# Patient Record
Sex: FEMALE | Race: WHITE | NOT HISPANIC OR LATINO | Employment: FULL TIME | URBAN - METROPOLITAN AREA
[De-identification: names, ages, dates, MRNs, and addresses within clinical notes are randomized per-mention and may not be internally consistent; named-entity substitution may affect disease eponyms.]

---

## 2022-09-28 NOTE — PATIENT INSTRUCTIONS
Pharyngitis   AMBULATORY CARE:   Pharyngitis , or sore throat, is inflammation of the tissues and structures in your pharynx (throat)  Pharyngitis is most often caused by bacteria  It may also be caused by a cold or flu virus  Other causes include smoking, allergies, or acid reflux  Signs and symptoms that may occur with pharyngitis:   Sore throat or pain when you swallow    Fever, chills, and body aches    Hoarse or raspy voice    Cough, runny or stuffy nose, itchy or watery eyes    Headache    Upset stomach and loss of appetite    Mild neck stiffness    Swollen glands that feel like hard lumps when you touch your neck    White and yellow pus-filled blisters in the back of your throat    Call 911 for any of the following: You have trouble breathing or swallowing because your throat is swollen or sore  Seek care immediately if:   You are drooling because it hurts too much to swallow  Your fever is higher than 102? F (39?C) or lasts longer than 3 days  You are confused  You taste blood in your throat  Contact your healthcare provider if:   Your throat pain gets worse  You have a painful lump in your throat that does not go away after 5 days  Your symptoms do not improve after 5 days  You have questions or concerns about your condition or care  Treatment for pharyngitis:  Viral pharyngitis will go away on its own without treatment  Your sore throat should start to feel better in 3 to 5 days for both viral and bacterial infections  You may need any of the following:  Antibiotics  treat a bacterial infection  NSAIDs , such as ibuprofen, help decrease swelling, pain, and fever  NSAIDs can cause stomach bleeding or kidney problems in certain people  If you take blood thinner medicine, always ask your healthcare provider if NSAIDs are safe for you  Always read the medicine label and follow directions  Acetaminophen  decreases pain and fever  It is available without a doctor's order  Ask how much to take and how often to take it  Follow directions  Acetaminophen can cause liver damage if not taken correctly  Manage your symptoms:   Gargle salt water  Mix ¼ teaspoon salt in an 8 ounce glass of warm water and gargle  This may help decrease swelling in your throat  Drink liquids as directed  You may need to drink more liquids than usual  Liquids may help soothe your throat and prevent dehydration  Ask how much liquid to drink each day and which liquids are best for you  Use a cool-steam humidifier  to help moisten the air in your room and calm your cough  Soothe your throat  with cough drops, ice, soft foods, or popsicles  Prevent the spread of pharyngitis:  Cover your mouth and nose when you cough or sneeze  Do not share food or drinks  Wash your hands often  Use soap and water  If soap and water are unavailable, use an alcohol based hand   Follow up with your doctor as directed:  Write down your questions so you remember to ask them during your visits  © Copyright [employer] 2022 Information is for End User's use only and may not be sold, redistributed or otherwise used for commercial purposes  All illustrations and images included in CareNotes® are the copyrighted property of A D A M [employer] Inc  or [facility] Rolando Pink   The above information is an  only  It is not intended as medical advice for individual conditions or treatments  Talk to your doctor, nurse or pharmacist before following any medical regimen to see if it is safe and effective for you

## 2022-09-28 NOTE — PROGRESS NOTES
Name: Maciej Payment      : 1965      MRN: [id number]  Encounter Provider: JEANINE Mullen  Encounter Date: 2022   Encounter department: [facility] [address] [place] [address]    Assessment & Plan     Patient presentation suggestive of upper respiratory infection vs bacterial sinusitis  Due to patient's history of COPD and double sickening presentation, will treat more aggressively  Doxycycline 100 mg b i d  X 10 days ordered for patient  Antibiotic was selected as it will cover for both Lyme disease and bacterial sinusitis  Rapid COVID test was negative in the office today  Educated patient that this antibiotic will cause photosensitivity and encouraged her to wear sunscreen if outdoors  Discussed what signs and symptoms warrant emergent medical care  Patient verbalized understanding  1  Acute bacterial rhinosinusitis  -     doxycycline hyclate (VIBRAMYCIN) 100 mg capsule; Take 1 capsule (100 mg total) by mouth every 12 (twelve) hours for 10 days    2  Sore throat  -     POCT Rapid Covid Ag    3  Insect bite of lower back, initial encounter         Subjective      Sore Throat  Patient complains of sore throat  Associated symptoms include headache, hoarseness, productive cough and sore throat  Onset of symptoms was 1 week ago, and have been gradually worsening since that time (double sickening effect)  She is drinking plenty of fluids  She has not had recent close exposure to someone with proven streptococcal pharyngitis  Boyfriend has bronchitis  Tested for COVID before sore throat, result was negative  Had a bug bite on left flank about 4 weeks ago and had a headache, denies that it was a tick bite  "That headache is done but I do have one today"  Treating currently with hot tea and OTC analgesics, little improvement  Hx of COPD    Review of Systems   Constitutional: Negative for fatigue and fever     HENT: Positive for rhinorrhea and sore throat  Negative for sinus pressure and sinus pain  B/l ear fullness   Eyes: Negative  Respiratory: Positive for cough  Gastrointestinal: Positive for nausea  Endocrine: Negative  Genitourinary: Negative  Musculoskeletal: Negative  Skin: Negative for rash  Allergic/Immunologic: Negative  Neurological: Positive for headaches  Hematological: Negative  Psychiatric/Behavioral: Negative  Current Outpatient Medications on File Prior to Visit   Medication Sig    albuterol (PROVENTIL HFA,VENTOLIN HFA) 90 mcg/act inhaler as needed    atenolol (TENORMIN) 25 mg tablet Take 25 mg by mouth daily    Calcium Carb-Cholecalciferol 500-400 MG-UNIT CHEW     sertraline (ZOLOFT) 25 mg tablet Take 25 mg by mouth daily    temazepam (RESTORIL) 15 mg capsule TAKE 1 CAPSULE BY MOUTH NIGHTLY AS NEEDED FOR INSOMNIA    Trelegy Ellipta 100-62 5-25 MCG/INH inhaler Inhale 1 puff daily       Objective     /60 (BP Location: Left arm, Patient Position: Sitting, Cuff Size: Large)   Pulse 85   Temp 98 2 °F (36 8 °C)   Ht 5' 4" (1 626 m)   Wt 63 5 kg (140 lb)   SpO2 98%   BMI 24 03 kg/m²     Physical Exam  Constitutional:       General: She is not in acute distress  Appearance: She is well-developed  HENT:      Head: Normocephalic  Right Ear: Tympanic membrane and ear canal normal       Left Ear: Tympanic membrane and ear canal normal       Nose: Rhinorrhea present  No congestion  Right Sinus: No maxillary sinus tenderness or frontal sinus tenderness  Left Sinus: No maxillary sinus tenderness or frontal sinus tenderness  Mouth/Throat:      Mouth: Mucous membranes are moist       Pharynx: Oropharynx is clear  Posterior oropharyngeal erythema present  No oropharyngeal exudate  Tonsils: No tonsillar exudate or tonsillar abscesses  1+ on the right  1+ on the left  Cardiovascular:      Rate and Rhythm: Normal rate and regular rhythm        Heart sounds: Normal heart sounds  Pulmonary:      Effort: Pulmonary effort is normal       Breath sounds: Wheezing present  Lymphadenopathy:      Cervical: No cervical adenopathy  Skin:     Findings: No rash  Neurological:      General: No focal deficit present  Mental Status: She is alert and oriented to person, place, and time     Psychiatric:         Mood and Affect: Mood normal          Behavior: Behavior normal        JEANINE Muhammad

## 2022-09-29 ENCOUNTER — OFFICE VISIT (OUTPATIENT)
Dept: FAMILY MEDICINE CLINIC | Facility: CLINIC | Age: 57
End: 2022-09-29

## 2022-09-29 VITALS
WEIGHT: 140 LBS | HEART RATE: 85 BPM | HEIGHT: 64 IN | SYSTOLIC BLOOD PRESSURE: 122 MMHG | DIASTOLIC BLOOD PRESSURE: 60 MMHG | OXYGEN SATURATION: 98 % | TEMPERATURE: 98.2 F | BODY MASS INDEX: 23.9 KG/M2
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
## 2022-09-29 DIAGNOSIS — B96.89 ACUTE BACTERIAL RHINOSINUSITIS: Primary | ICD-10-CM

## 2022-09-29 DIAGNOSIS — S30.860A INSECT BITE OF LOWER BACK, INITIAL ENCOUNTER: ICD-10-CM

## 2022-09-29 DIAGNOSIS — J02.9 SORE THROAT: ICD-10-CM

## 2022-09-29 DIAGNOSIS — J01.90 ACUTE BACTERIAL RHINOSINUSITIS: Primary | ICD-10-CM

## 2022-09-29 PROBLEM — M54.50 LOW BACK PAIN: Status: ACTIVE | Noted: 2022-09-29

## 2022-09-29 PROBLEM — G47.00 INSOMNIA: Status: ACTIVE | Noted: 2022-09-29

## 2022-09-29 PROBLEM — B18.2 CARRIER OF VIRAL HEPATITIS C (HCC): Status: ACTIVE | Noted: 2021-12-20

## 2022-09-29 PROBLEM — R51.9 DAILY HEADACHE: Status: ACTIVE | Noted: 2021-11-16

## 2022-09-29 PROBLEM — R41.3 POOR SHORT TERM MEMORY: Status: ACTIVE | Noted: 2021-11-16

## 2022-09-29 PROBLEM — J45.909 REACTIVE AIRWAY DISEASE: Status: ACTIVE | Noted: 2022-09-29

## 2022-09-29 PROBLEM — M51.37 DEGENERATION OF LUMBOSACRAL INTERVERTEBRAL DISC: Status: ACTIVE | Noted: 2022-09-29

## 2022-09-29 PROBLEM — F41.8 MIXED ANXIETY DEPRESSIVE DISORDER: Status: ACTIVE | Noted: 2022-09-29

## 2022-09-29 PROBLEM — M51.379 DEGENERATION OF LUMBOSACRAL INTERVERTEBRAL DISC: Status: ACTIVE | Noted: 2022-09-29

## 2022-09-29 PROBLEM — J44.9 CHRONIC OBSTRUCTIVE PULMONARY DISEASE (HCC): Status: ACTIVE | Noted: 2018-12-07

## 2022-09-29 PROBLEM — Z72.0 TOBACCO USER: Status: ACTIVE | Noted: 2020-01-09

## 2022-09-29 LAB
SARS-COV-2 AG UPPER RESP QL IA: NEGATIVE
VALID CONTROL: NORMAL

## 2022-09-29 PROCEDURE — 87811 SARS-COV-2 COVID19 W/OPTIC: CPT | Performed by: NURSE PRACTITIONER

## 2022-09-29 PROCEDURE — 99203 OFFICE O/P NEW LOW 30 MIN: CPT | Performed by: NURSE PRACTITIONER

## 2022-09-29 RX ORDER — ALBUTEROL SULFATE 90 UG/1
AEROSOL, METERED RESPIRATORY (INHALATION) AS NEEDED
COMMUNITY

## 2022-09-29 RX ORDER — SERTRALINE HYDROCHLORIDE 25 MG/1
25 TABLET, FILM COATED ORAL DAILY
COMMUNITY
Start: 2022-08-12

## 2022-09-29 RX ORDER — DOXYCYCLINE HYCLATE 100 MG/1
100 CAPSULE ORAL EVERY 12 HOURS SCHEDULED
Qty: 20 CAPSULE | Refills: 0 | Status: SHIPPED | OUTPATIENT
Start: 2022-09-29 | End: 2022-10-09

## 2022-09-29 RX ORDER — TEMAZEPAM 15 MG/1
CAPSULE ORAL
COMMUNITY
Start: 2022-08-19

## 2022-09-29 RX ORDER — ATENOLOL 25 MG/1
25 TABLET ORAL DAILY
COMMUNITY
Start: 2022-09-27

## 2023-06-02 NOTE — PROGRESS NOTES
Assessment/Plan:    No problem-specific Assessment & Plan notes found for this encounter  Diagnoses and all orders for this visit:    Ruptured or perforated eardrum, left  -     ofloxacin (FLOXIN) 0 3 % otic solution; Administer 5 drops into the left ear daily    Left ear pain  -     ofloxacin (FLOXIN) 0 3 % otic solution; Administer 5 drops into the left ear daily    Other orders  -     Biotin 1 MG CAPS; Take by mouth  -     magnesium oxide (MAG-OX) 400 mg tablet; Take 400 mg by mouth daily  -     Rhofade 1 % CREA; Apply TO FACE EVERY MORNING  -     pravastatin (PRAVACHOL) 20 mg tablet; Take 20 mg by mouth every evening  -     sertraline (ZOLOFT) 100 mg tablet; Take 100 mg by mouth daily          Patient is a 47-year-old female presenting today for left sided ear pain  On examination patient had a left-sided perforated eardrum  I do not see any signs of infection however I will prescribe patient ofloxacin drops to have on hand in case her pain starts to worsen  I provided her with instructions today on how to care for ruptured eardrum at home she has a very small perforation at the lower aspect of the left eardrum  I discussed with her typical course can take several weeks to 1 to 2 months to heal   The pain should improve with time  She should try to wear earplugs and not get any fluid in her ears when showering  I did advise that if symptoms continue or get worse then she will need to see an ENT physician for an evaluation  Patient understands all of the above  20 Minutes spent on physical exam and plan of care  This note was dictated using software  Subjective:      Patient ID: Merle Costa is a 59 y o  female      HPI    The following portions of the patient's history were reviewed and updated as appropriate: allergies, current medications, past family history, past medical history, past social history, past surgical history and problem list     Patient complains of ear pain and possible ear infection  Symptoms include left ear pain  Onset of symptoms was 5 days ago, and have been worse since trying to clear out ear with squeeze bottle since that time  Associated symptoms include: none  Patient denies: congestion, sinus pressure, sneezing and sore throat  She is drinking plenty of fluids  Patient states that this started when she was doing a treatment at home to try and remove earwax  She describes it as a type of suction device and she began to have sudden sharp severe pain  The pain subsided however she has a feeling of air sensation in the ear and aching sensation  Review of Systems   HENT: Positive for ear pain  Negative for hearing loss and sinus pressure  Respiratory: Negative for shortness of breath  Cardiovascular: Negative for chest pain  Gastrointestinal: Negative for abdominal pain  Skin: Negative for rash  Objective:      /66 (BP Location: Right arm, Patient Position: Sitting, Cuff Size: Standard)   Pulse 62   Temp (!) 96 °F (35 6 °C)   Wt 64 4 kg (142 lb)   SpO2 97%   BMI 24 37 kg/m²          Physical Exam  Vitals and nursing note reviewed  Constitutional:       General: She is not in acute distress  Appearance: Normal appearance  HENT:      Head: Normocephalic and atraumatic  Right Ear: Tympanic membrane, ear canal and external ear normal  There is no impacted cerumen  Tympanic membrane is not perforated or erythematous  Left Ear: Ear canal and external ear normal  There is no impacted cerumen  Tympanic membrane is perforated  Tympanic membrane is not erythematous  Nose: Nose normal       Mouth/Throat:      Mouth: Mucous membranes are moist       Pharynx: Oropharynx is clear  Eyes:      Extraocular Movements: Extraocular movements intact  Conjunctiva/sclera: Conjunctivae normal       Pupils: Pupils are equal, round, and reactive to light  Cardiovascular:      Rate and Rhythm: Normal rate and regular rhythm  Heart sounds: Normal heart sounds  No murmur heard  No friction rub  No gallop  Pulmonary:      Effort: Pulmonary effort is normal  No respiratory distress  Breath sounds: Normal breath sounds  No wheezing  Musculoskeletal:         General: Normal range of motion  Cervical back: Normal range of motion  Skin:     General: Skin is warm and dry  Findings: No erythema or rash  Neurological:      General: No focal deficit present  Mental Status: She is alert and oriented to person, place, and time  Mental status is at baseline  Psychiatric:         Mood and Affect: Mood normal          Behavior: Behavior normal          Thought Content:  Thought content normal          Judgment: Judgment normal

## 2023-06-05 ENCOUNTER — OFFICE VISIT (OUTPATIENT)
Age: 58
End: 2023-06-05

## 2023-06-05 VITALS
BODY MASS INDEX: 24.37 KG/M2 | OXYGEN SATURATION: 97 % | HEART RATE: 62 BPM | WEIGHT: 142 LBS | SYSTOLIC BLOOD PRESSURE: 102 MMHG | DIASTOLIC BLOOD PRESSURE: 66 MMHG | TEMPERATURE: 96 F

## 2023-06-05 DIAGNOSIS — H92.02 LEFT EAR PAIN: ICD-10-CM

## 2023-06-05 DIAGNOSIS — H72.92 RUPTURED OR PERFORATED EARDRUM, LEFT: Primary | ICD-10-CM

## 2023-06-05 PROCEDURE — 99213 OFFICE O/P EST LOW 20 MIN: CPT | Performed by: STUDENT IN AN ORGANIZED HEALTH CARE EDUCATION/TRAINING PROGRAM

## 2023-06-05 RX ORDER — SERTRALINE HYDROCHLORIDE 100 MG/1
100 TABLET, FILM COATED ORAL DAILY
COMMUNITY
Start: 2023-05-31

## 2023-06-05 RX ORDER — MAGNESIUM OXIDE 400 MG/1
400 TABLET ORAL DAILY
COMMUNITY

## 2023-06-05 RX ORDER — OFLOXACIN 3 MG/ML
5 SOLUTION AURICULAR (OTIC) DAILY
Qty: 5 ML | Refills: 0 | Status: SHIPPED | OUTPATIENT
Start: 2023-06-05

## 2023-06-05 RX ORDER — PRAVASTATIN SODIUM 20 MG
20 TABLET ORAL EVERY EVENING
COMMUNITY
Start: 2023-05-22

## 2023-06-05 RX ORDER — OXYMETAZOLINE HYDROCHLORIDE 1 G/100G
CREAM TOPICAL
COMMUNITY
Start: 2023-02-28

## 2023-06-05 RX ORDER — NICOTINE POLACRILEX 2 MG
GUM BUCCAL
COMMUNITY

## 2025-04-16 ENCOUNTER — TELEPHONE (OUTPATIENT)
Age: 60
End: 2025-04-16

## 2025-04-16 NOTE — TELEPHONE ENCOUNTER
Pt stated that she is looking for an orthopedic provider. I read [facility]PA disclaimer pt stated that she had all those injections done already. She would like to be see by orthopedic provider for her medications. Transferred pt to ortho.

## 2025-04-28 ENCOUNTER — APPOINTMENT (OUTPATIENT)
Age: 60
End: 2025-04-28
Attending: STUDENT IN AN ORGANIZED HEALTH CARE EDUCATION/TRAINING PROGRAM
Payer: MEDICARE

## 2025-04-28 ENCOUNTER — OFFICE VISIT (OUTPATIENT)
Age: 60
End: 2025-04-28
Payer: MEDICARE

## 2025-04-28 VITALS — HEIGHT: 64 IN | BODY MASS INDEX: 23.9 KG/M2 | WEIGHT: 140 LBS

## 2025-04-28 DIAGNOSIS — G89.29 CHRONIC BILATERAL LOW BACK PAIN WITHOUT SCIATICA: Primary | ICD-10-CM

## 2025-04-28 DIAGNOSIS — M47.816 LUMBAR SPONDYLOSIS: ICD-10-CM

## 2025-04-28 DIAGNOSIS — M48.062 SPINAL STENOSIS OF LUMBAR REGION WITH NEUROGENIC CLAUDICATION: ICD-10-CM

## 2025-04-28 DIAGNOSIS — M54.50 CHRONIC BILATERAL LOW BACK PAIN WITHOUT SCIATICA: Primary | ICD-10-CM

## 2025-04-28 DIAGNOSIS — M54.50 CHRONIC BILATERAL LOW BACK PAIN WITHOUT SCIATICA: ICD-10-CM

## 2025-04-28 DIAGNOSIS — G89.29 CHRONIC BILATERAL LOW BACK PAIN WITHOUT SCIATICA: ICD-10-CM

## 2025-04-28 PROCEDURE — 72100 X-RAY EXAM L-S SPINE 2/3 VWS: CPT

## 2025-04-28 PROCEDURE — 99204 OFFICE O/P NEW MOD 45 MIN: CPT | Performed by: STUDENT IN AN ORGANIZED HEALTH CARE EDUCATION/TRAINING PROGRAM

## 2025-04-28 RX ORDER — GABAPENTIN 100 MG/1
100 CAPSULE ORAL 3 TIMES DAILY
Qty: 90 CAPSULE | Refills: 0 | Status: SHIPPED | OUTPATIENT
Start: 2025-04-28

## 2025-04-28 NOTE — PROGRESS NOTES
Name: Aroldo Leyva      : 1965      MRN: [id number]  Encounter Provider: Nikolay De Jesus MD  Encounter Date: 2025   Encounter department: [facility]      Assessment & Plan  Chronic bilateral low back pain without sciatica    Clinical exam and radiographic imaging reviewed with patient/parent today, with impression as per above. I have discussed with the patient the pathophysiology of this diagnosis and reviewed how the examination correlates with this diagnosis.    Imaging obtained/reviewed as per below. I will follow up official radiology interpretation.  Given patient's reported chronicity of this issue ongoing for years and lack of relief from conservative treatment such as formal PT, lumbar injections with pain management (reportedly went to [facility]), acetaminophen, NSAIDs, muscle relaxers, heat/ice therapy I counseled she could consider discussing with orthopedic spine surgery in regards to potential surgical interventions.  Patient prefers to avoid surgical interventions if possible but is open to discussing further if it is absolutely needed as she feels this pain has been interfering with her activities of daily living.  In the interim, I did offer returning back to formal physical therapy to help facilitate improving her lumbar range of motion as she does demonstrate some stiffness on exam.  She was agreeable to this and referral was placed.  In regards to pain control counseled as needed use of acetaminophen, NSAIDs, Robaxin.  She also was prescribed tramadol for severe pain.  I did prescribe her gabapentin 100 mg p.o. 3 times daily and counseled her for taper up from nightly to 3 times daily as tolerated and discussed potential side effects of gabapentin.    Orders:    XR spine lumbar 2 or 3 views injury; Future    MRI lumbar spine wo contrast; Future    Ambulatory Referral to Orthopedic Surgery; Future    Ambulatory  Referral to Physical Therapy; Future    gabapentin (Neurontin) 100 mg capsule; Take 1 capsule (100 mg total) by mouth 3 (three) times a day    Spinal stenosis of lumbar region with neurogenic claudication    Orders:    XR spine lumbar 2 or 3 views injury; Future    MRI lumbar spine wo contrast; Future    Ambulatory Referral to Orthopedic Surgery; Future    Ambulatory Referral to Physical Therapy; Future    gabapentin (Neurontin) 100 mg capsule; Take 1 capsule (100 mg total) by mouth 3 (three) times a day    Lumbar spondylosis    Orders:    XR spine lumbar 2 or 3 views injury; Future    MRI lumbar spine wo contrast; Future    Ambulatory Referral to Orthopedic Surgery; Future    Ambulatory Referral to Physical Therapy; Future    gabapentin (Neurontin) 100 mg capsule; Take 1 capsule (100 mg total) by mouth 3 (three) times a day         Return for Follow up with Dr. Hathaway from spine surgery after MRI. For second opinion/discussion for possible surgery    History of Present Illness       Chief Complaint   Patient presents with    Spine - Pain       HPI:  Aroldo Leyva is a 59 y.o. female  who presents her with her mother for     Visit 4/28/2025:  Initial evaluation of low back pain:  Of note, patient states she had been managed through [facility] already and is here today for orthopedic evaluation.  I am unable to review the notes from [facility].  Patient discusses undergoing lumbar injections as well as an evaluation for a spinal stimulator but did not feel there was any relief from these interventions.  She states she was told in the past that she cannot undergo surgical intervention for her spine but is unsure the details of why she was told this.  Previously treated for a comp injury after falling down steps on 11/29/2023 in regards to her low back and left shin pain.    Treated conservatively with formal physical therapy, NSAIDs, muscle relaxers.  She also was  prescribed tramadol for severe pain.  She attended physical therapy as well.  She states from these interventions there is very limited relief in regards to the intensity and frequency of pain.  She states the pain currently is a aching sensation along the midline and paralumbar aspect of her back.  Denies pain radiates down her legs.  Reports intermittent tingling sensations of her left leg in nonspecific distribution.  Patient unsure what aggravates the numbness of her left lower extremity.  She states the pain is constant but also aggravated with prolonged sitting, prolonged walking.  She does endorse a sensation of lower extremity fatigue while weightbearing and feels she has to frequently rest after about 3 blocks of walking before she can regain the sensation of being able to walk again.    Denies bowel/bladder incontinence    Concurrently has also been seeing orthopedics for her left knee and undergoing cortisone injections of her left knee.      History reviewed. No pertinent past medical history.    History reviewed. No pertinent surgical history.    Current Outpatient Medications on File Prior to Visit   Medication Sig Dispense Refill    albuterol (PROVENTIL HFA,VENTOLIN HFA) 90 mcg/act inhaler as needed      atenolol (TENORMIN) 25 mg tablet Take 25 mg by mouth daily      Biotin 1 MG CAPS Take by mouth      Calcium Carb-Cholecalciferol 500-400 MG-UNIT CHEW       magnesium oxide (MAG-OX) 400 mg tablet Take 400 mg by mouth daily      pravastatin (PRAVACHOL) 20 mg tablet Take 20 mg by mouth every evening      sertraline (ZOLOFT) 25 mg tablet Take 25 mg by mouth daily      Trelegy Ellipta 100-62.5-25 MCG/INH inhaler Inhale 1 puff daily      ofloxacin (FLOXIN) 0.3 % otic solution Administer 5 drops into the left ear daily 5 mL 0    Rhofade 1 % CREA Apply TO FACE EVERY MORNING      sertraline (ZOLOFT) 100 mg tablet Take 100 mg by mouth daily (Patient not taking: Reported on 4/28/2025)      temazepam (RESTORIL)  "15 mg capsule TAKE 1 CAPSULE BY MOUTH NIGHTLY AS NEEDED FOR INSOMNIA       No current facility-administered medications on file prior to visit.        Social History     Objective     Ht 5' 4\" (1.626 m)   Wt 63.5 kg (140 lb)   BMI 24.03 kg/m²     Constitutional:  see vital signs  Gen: well-developed, normocephalic/atraumatic, well-groomed  SKIN: no visible rashes or skin lesions  Pulmonary/Chest: Effort normal. No respiratory distress.      Nikolay De Jesus MD     Ortho Exam  BACK EXAM:  Posture: Normal. No kyphosis, lordosis, scoliosis  Gait: normal, no trendelenberg gait, no antalgic gait, no shuffling gait    BACK TENDERNESS:  Spinous Processes: no  Paraspinal Muscles: + Mildly paralumbar bilaterally  SI Joint: no  Sacrum/Coccyx: no    ROM:  Flexion: 80  Extension: 30  Lateral flexion: 20 b/l, aggravates bilateral paralumbar tightness/aching per patient  Rotation: intact: 20 b/l, aggravates bilateral paralumbar tightness/aching per patient    DERMATOMAL SENSATION:  L1: normal   L2: normal   L3: normal   L4: normal   L5: Decreased on left compared to right  S1: normal    MOTOR STRENGTH:   Right Left   Hip flexion 5-/5, aggravates low back pain while performing 5-/5, aggravates low back pain while performing   Knee Extension 5/5 5/5   Knee flexion 5/5 5/5   Foot Dorsiflexion 5/5 5/5   Foot Plantarflexion 5/5 5/5   Great toe extension 5/5 5/5       REFLEXES:  Patellar (L3-4): 2+ bilateral  Achilles (S1-2): 2+ bilateral  Clonus: negative bilateral    BACK:   SUPINE STRAIGHT LEG: negative for exacerbating leg pain but does seem to exacerbate axial low back pain bilaterally.  Patient notes while it exacerbates her low back pain she does not feel any shooting pain or numbness and tingling of her lower extremities.  Popliteal Angle: 45 bilaterally    HIP (bilateral):  ROM: Right IR/ER-30/60: Left IR/ER 30/60  LOG ROLL: negative  TAMIR: negative  FADIR: negative          Imaging Studies (I personally reviewed images in " "PACS and report):  X-ray lumbar spine 4/28/2025: No acute osseous abnormalities.  Thoracolumbar scoliosis.  Multilevel degenerative disc disease most prominent at L2-3.  Grade 1 spondylolisthesis at L3-4      X-ray right hip 10/10/2024: [facility].  Only report available noting \"mild right hip osteoarthritis.\"    Patient underwent CT abdomen pelvis with and without contrast on 5/21/2024: [facility].  I am unable to view images but there is a report in regards to osseous structures noting:  Degenerative disc changes at the L2-L3, L3-L4 and L4-L5 disc levels with mild anterior listhesis of L4 on L4 are not significantly changed. There is a stable scoliosis. Uterus and ovaries are normal.    There is an MRI report scanned into her chart of her lumbar spine without contrast done on 12/19/2023: [facility].  I am unable to view images but there was a report noting:  Multilevel disc degeneration and facet disease.  Moderate left neuroforaminal narrowing at L2-3  Grade 1 degenerative spondylolisthesis at L3-4 with mild central and bilateral neuroforaminal narrowing.  Moderate right and mild left neuroforaminal narrowing at L4-5.        MRI lumbar spine without contrast 12/22/2022: [facility].  Only report is available noting  1. Edema in the superior L3 vertebral body in the left posterolateral corner, without loss of height, could represent degenerative edema versus nondepressed fracture.   2. Marrow edema noted at the endplates of L4-5 on the right side is degenerative in nature and new since prior study.   3. Degenerative changes of the mid to lower lumbar spine as described above. Severe right-sided neural foraminal stenosis at L4-5, with mild central canal stenosis at L4-5.   4. Mild central canal stenosis at L3-4 with mild to moderate left-sided neural foraminal stenosis at L3-4.     Procedures    -----------------------------------------------------------------  Portions of the record " "may have been created with voice recognition software. Occasional wrong word or \"sound a like\" substitutions may have occurred due to the inherent limitations of voice recognition software. Read the chart carefully and recognize, using context, where substitutions have occurred.     "

## 2025-04-28 NOTE — ASSESSMENT & PLAN NOTE
Clinical exam and radiographic imaging reviewed with patient/parent today, with impression as per above. I have discussed with the patient the pathophysiology of this diagnosis and reviewed how the examination correlates with this diagnosis.    Imaging obtained/reviewed as per below. I will follow up official radiology interpretation.  Given patient's reported chronicity of this issue ongoing for years and lack of relief from conservative treatment such as formal PT, lumbar injections with pain management (reportedly went to [facility]), acetaminophen, NSAIDs, muscle relaxers, heat/ice therapy I counseled she could consider discussing with orthopedic spine surgery in regards to potential surgical interventions.  Patient prefers to avoid surgical interventions if possible but is open to discussing further if it is absolutely needed as she feels this pain has been interfering with her activities of daily living.  In the interim, I did offer returning back to formal physical therapy to help facilitate improving her lumbar range of motion as she does demonstrate some stiffness on exam.  She was agreeable to this and referral was placed.  In regards to pain control counseled as needed use of acetaminophen, NSAIDs, Robaxin.  She also was prescribed tramadol for severe pain.  I did prescribe her gabapentin 100 mg p.o. 3 times daily and counseled her for taper up from nightly to 3 times daily as tolerated and discussed potential side effects of gabapentin.    Orders:    XR spine lumbar 2 or 3 views injury; Future    MRI lumbar spine wo contrast; Future    Ambulatory Referral to Orthopedic Surgery; Future    Ambulatory Referral to Physical Therapy; Future    gabapentin (Neurontin) 100 mg capsule; Take 1 capsule (100 mg total) by mouth 3 (three) times a day

## 2025-04-29 ENCOUNTER — TELEPHONE (OUTPATIENT)
Dept: OBGYN CLINIC | Facility: MEDICAL CENTER | Age: 60
End: 2025-04-29

## 2025-04-29 NOTE — TELEPHONE ENCOUNTER
----- Message from Nikolay De Jesus MD sent at 4/28/2025  5:48 PM EDT -----  Rebekah Melendez,  I was wondering if you could get documentation from the patient prior doctors? Apparently she had seen [facility] in [place] but I do not see any notes in her chart about what they had done with her already in regards to her low back pain.    Thanks

## 2025-04-30 NOTE — TELEPHONE ENCOUNTER
Caller: Patient    Doctor: Dr. De Jesus referral    Reason for call:     Patient calling about the referral request, she is not ready to set an appointment with the  referral for [facility], she will call back after she receives her MRI she states.    Call back#: n/a

## 2025-05-08 ENCOUNTER — EVALUATION (OUTPATIENT)
Age: 60
End: 2025-05-08
Payer: MEDICARE

## 2025-05-08 DIAGNOSIS — M54.50 CHRONIC MIDLINE LOW BACK PAIN WITHOUT SCIATICA: Primary | ICD-10-CM

## 2025-05-08 DIAGNOSIS — G89.29 CHRONIC MIDLINE LOW BACK PAIN WITHOUT SCIATICA: Primary | ICD-10-CM

## 2025-05-08 PROCEDURE — 97162 PT EVAL MOD COMPLEX 30 MIN: CPT | Performed by: PHYSICAL THERAPIST

## 2025-05-08 NOTE — PROGRESS NOTES
PT Evaluation     Today's date: 2025  Patient name: Aroldo Leyva  : 1965  MRN: [id number]  Referring provider: No ref. provider found  Dx:   Encounter Diagnosis     ICD-10-CM    1. Chronic midline low back pain without sciatica  M54.50     G89.29           Start Time: 09  Stop Time: 1030  Total time in clinic (min): 45 minutes    Assessment  Impairments: abnormal coordination, abnormal muscle firing, abnormal or restricted ROM, abnormal movement, activity intolerance, impaired balance, impaired physical strength, lacks appropriate home exercise program, pain with function, poor posture  and poor body mechanics  Functional limitations: walking, standing, bending, lifting,  Symptom irritability: moderate    Assessment details: Aroldo Leyva is a 59 y.o. female presenting with chronic LBP and associated hyperalgesia likely centrally mediated. Primary impairments include hyperalgesia, pain, weakness, ROM. Will benefit from skilled PT interventions for return to improved community activity tolerance. . HEP was provided and reviewed. Patient is able to complete HEP with good technique and appropriate pain response. Patient expressed understanding of appropriate dosage and frequency of HEP. No additional referral necessary.       Goals    Short Term Goals:  In 6 weeks, the patient will:  1. Lumbar ROM to >50% pain free  2. Core mmt to 3/5  3. Supervision with HEP for self care    Long Term Goals:  In 12 weeks, the patient will:  1. Standing toleacen to >1hour  2. FOTO to greater than predicted value  3. Independent with HEP for selfcare    Plan  Patient would benefit from: skilled physical therapy    Planned therapy interventions: joint mobilization, manual therapy, massage, Salgado taping, neuromuscular re-education, patient education, postural training, self care, strengthening, stretching, therapeutic activities, therapeutic exercise, therapeutic training, graded exercise, graded activity, home  exercise program, flexibility, functional ROM exercises, balance and activity modification    Frequency: 2x week  Duration in weeks: 12  Treatment plan discussed with: patient      Subjective  Pain Location: Localized LBP, non radicular,   Pain Intensity: 5/10 at rest  ERI: Chronic LBP (~5year hx), gradual onset   Provoked by: prolonged standing, walking, prolonged, bending, lifting,   Eased Positions: positional changes,   Constitutional S/S: Denies   Goals: reduce pain, improve activity tolerance    Objective  Red Flags:Denies  Postural Findings: L knee varus  Head Position x Protracted  Neutral  Retracted   Scapular Position x Protracted  Neutral  Retracted   Thoracic Spine x Inc Kyphosis  Neutral     Lumbar Spine  Inc Lordosis  Neutral x Dec Lordosis   Pelvis  Anterior Tilt  Neutral x Posterior Tilt   Lateral Shift  Right  Left x None     Strength and ROM evaluated B from a regional biomechanical perspective and values relevant to this episode recorded in tables below.    ROM:   Joint / Motion  Right  Left    Lumbar Flexion  50% pain     Lumbar Extension  50%    Lumbar Sidebending  50% 50%   Lumbar rotation 50% 50%   Hip ER  WNL WNL   Hip IR  WNL WNL     Repeated Movements: No change    NEURO Screen  Reflexes  Right Left   Patellar (L3-L4) 2+ 2+   Achilles (S1-S2) 2+ 2+   Bradshaw's n n   Clonus n n     LE Myotomes:  Nerve root Test Action RIGHT  LEFT   L1-L2 Hip Flexion 5 5   L3 Knee Extension 5 5   L4  Ankle DF 5 5   L5 Great toe Extension 5 5   S1 Ankle PF, ankle eversion, hip extension, knee flexion 5   5     S2 Knee Flexion 5 5     Dermatomes: intact    Additional Assessments:  Pain with palpation noted: Generalized hyperalgesia  Joint Mobility: pain with G1 Pas thorughout L/S     Special Tests:  Lumbar Specific and Neural Tension                                                                            Test / Measure  Right 5/8/2025 Left 5/8/2025   Straight Leg raise 70p 70p   Crossed straight leg raise n  n   Slump test n n   Prone instability test p p     SI Joint Screen: NO Presence of Inominate asymmetry, (P) March Test, TTP posterior SIJ ligaments       Precautions: HX of cervical fusion 2023, Hx of smoking (quit in Feb 2025)    Visit/Unit Tracking  AUTH Status:  Date 5/8     pending Used 1      Remaining          Pertinent Findings:      POC End Date: 8/8/25                                                                                          Test / Measure  5/8   FOTO (Predicted 50) 41   Core mmt 2+/5   Standing tolerance 2'         Treatment Based Classification: Primary graded exposure Secondary stabilization    Manuals 5/8                   Neuro Re-Ed                                Ther Ex    HEP/PN 8                               Ther Activity            Gait Training            Modalities

## 2025-05-09 ENCOUNTER — TELEPHONE (OUTPATIENT)
Dept: OBGYN CLINIC | Facility: HOSPITAL | Age: 60
End: 2025-05-09

## 2025-05-09 NOTE — TELEPHONE ENCOUNTER
Caller: Patient    Doctor: Dr. De Jesus    Reason for call: needs MRI order/script be faxed to [facility].  Patient would like a call to let her know once completed     Call back#: [phone]      Fax # [phone]

## 2025-05-09 NOTE — TELEPHONE ENCOUNTER
Caller: Patient    Doctor: Dr. De Jesus    Reason for call: Patient wants MRI script faxed to [facility], will call back with fax #.    Call back#: [phone]

## 2025-05-13 ENCOUNTER — APPOINTMENT (OUTPATIENT)
Age: 60
End: 2025-05-13
Payer: MEDICARE

## 2025-05-15 ENCOUNTER — OFFICE VISIT (OUTPATIENT)
Age: 60
End: 2025-05-15
Payer: MEDICARE

## 2025-05-15 DIAGNOSIS — G89.29 CHRONIC MIDLINE LOW BACK PAIN WITHOUT SCIATICA: Primary | ICD-10-CM

## 2025-05-15 DIAGNOSIS — M54.50 CHRONIC MIDLINE LOW BACK PAIN WITHOUT SCIATICA: Primary | ICD-10-CM

## 2025-05-15 PROCEDURE — 97110 THERAPEUTIC EXERCISES: CPT | Performed by: PHYSICAL THERAPIST

## 2025-05-15 PROCEDURE — 97112 NEUROMUSCULAR REEDUCATION: CPT | Performed by: PHYSICAL THERAPIST

## 2025-05-15 PROCEDURE — 97530 THERAPEUTIC ACTIVITIES: CPT | Performed by: PHYSICAL THERAPIST

## 2025-05-15 NOTE — TELEPHONE ENCOUNTER
Checking status wants to know if it has been faxed and if a call can be given once faxed.  Does not want to travel to [place] for MRI    Caller: Patient     Doctor: Dr. De Jesus     Reason for call: needs MRI order/script be faxed to [facility].  Patient would like a call to let her know once completed      Call back#: [phone]        Fax # [phone]

## 2025-05-15 NOTE — PROGRESS NOTES
"Daily Note     Today's date: 5/15/2025  Patient name: Aroldo Leyva  : 1965  MRN: [id number]  Referring provider: Nikolay De Jesus MD  Dx:   Encounter Diagnosis     ICD-10-CM    1. Chronic midline low back pain without sciatica  M54.50     G89.29           Start Time: 1030  Stop Time: 1123  Total time in clinic (min): 53 minutes    Subjective: Minimal change since eval.     Objective: See treatment diary below    Assessment: Tolerated treatment well. Patient demonstrated fatigue post treatment, exhibited good technique with therapeutic exercises, and would benefit from continued PT 1:1 with Oneal Yeh DPT for entirety of tx.     Plan: Continue per plan of care.      Precautions: HX of cervical fusion , Hx of smoking (quit in 2025)    Visit/Unit Tracking  AUTH Status:  Date 5/8 5/15    pending Used 1 2     Remaining          Pertinent Findings:      POC End Date: 25                                                                                          Test / Measure     FOTO (Predicted 50) 41   Core mmt 2+/5   Standing tolerance 2'         Treatment Based Classification: Primary graded exposure Secondary stabilization    Manuals 5/8 5/15                       Neuro Re-Ed     Ball crush  5\" 2x10   Bridges  2x15   HL clam  3x10 btb   Dead bug  2x10 ea   Paloff Press  2x10 bhb   Paloff Rot  2x10 bhb        Ther Ex     HEP/PN 8    NS  10'   LTRs  2x30                            Ther Activity     Suitcase carry     Front carry     Gait Training               Modalities                    "

## 2025-05-20 ENCOUNTER — OFFICE VISIT (OUTPATIENT)
Age: 60
End: 2025-05-20
Payer: MEDICARE

## 2025-05-20 DIAGNOSIS — G89.29 CHRONIC MIDLINE LOW BACK PAIN WITHOUT SCIATICA: Primary | ICD-10-CM

## 2025-05-20 DIAGNOSIS — M54.50 CHRONIC MIDLINE LOW BACK PAIN WITHOUT SCIATICA: Primary | ICD-10-CM

## 2025-05-20 PROCEDURE — 97530 THERAPEUTIC ACTIVITIES: CPT | Performed by: PHYSICAL THERAPIST

## 2025-05-20 PROCEDURE — 97110 THERAPEUTIC EXERCISES: CPT | Performed by: PHYSICAL THERAPIST

## 2025-05-20 PROCEDURE — 97112 NEUROMUSCULAR REEDUCATION: CPT | Performed by: PHYSICAL THERAPIST

## 2025-05-20 NOTE — PROGRESS NOTES
"Daily Note     Today's date: 2025  Patient name: Aroldo Leyva  : 1965  MRN: [id number]  Referring provider: Nikolay De Jesus MD  Dx:   Encounter Diagnosis     ICD-10-CM    1. Chronic midline low back pain without sciatica  M54.50     G89.29           Start Time: 1034  Stop Time: 1128  Total time in clinic (min): 54 minutes    Subjective: Still having pain but some increased activity tolerance.     Objective: See treatment diary below    Assessment: Tolerated treatment well. Patient demonstrated fatigue post treatment, exhibited good technique with therapeutic exercises, and would benefit from continued PT 1:1 with Oneal Yeh DPT for entirety of tx. Tolerated tx well, reduced pain and improved strength.     Plan: Continue per plan of care.      Precautions: HX of cervical fusion , Hx of smoking (quit in 2025)    Visit/Unit Tracking  AUTH Status:  Date 5/8 5/15 5/20   pending Used 1 2 3    Remaining          Pertinent Findings:      POC End Date: 25                                                                                          Test / Measure     FOTO (Predicted 50) 41   Core mmt 2+/5   Standing tolerance 2'         Treatment Based Classification: Primary graded exposure Secondary stabilization    Manuals 5/8 5/15 5/20                           Neuro Re-Ed      Ball crush  5\" 2x10 5\" 2x10   Bridges  2x15 2x15   HL clam  3x10 btb 3x10 btb   Dead bug  2x10 ea 3x10 ea   Paloff Press  2x10 bhb 2x10 bhb   Paloff Rot  2x10 bhb 2x10 bhb         Ther Ex      HEP/PN 8     NS  10' 10'   LTRs  2x30 2x30                                 Ther Activity      Suitcase carry   nv   Front carry   nv   Gait Training                  Modalities                         "

## 2025-05-22 ENCOUNTER — APPOINTMENT (OUTPATIENT)
Age: 60
End: 2025-05-22
Payer: MEDICARE

## 2025-05-28 ENCOUNTER — APPOINTMENT (OUTPATIENT)
Age: 60
End: 2025-05-28
Payer: MEDICARE

## 2025-05-29 ENCOUNTER — APPOINTMENT (OUTPATIENT)
Age: 60
End: 2025-05-29
Payer: MEDICARE

## 2025-06-05 ENCOUNTER — OFFICE VISIT (OUTPATIENT)
Age: 60
End: 2025-06-05
Payer: MEDICARE

## 2025-06-05 DIAGNOSIS — G89.29 CHRONIC MIDLINE LOW BACK PAIN WITHOUT SCIATICA: Primary | ICD-10-CM

## 2025-06-05 DIAGNOSIS — M54.50 CHRONIC MIDLINE LOW BACK PAIN WITHOUT SCIATICA: Primary | ICD-10-CM

## 2025-06-05 PROCEDURE — 97110 THERAPEUTIC EXERCISES: CPT | Performed by: PHYSICAL THERAPIST

## 2025-06-05 PROCEDURE — 97112 NEUROMUSCULAR REEDUCATION: CPT | Performed by: PHYSICAL THERAPIST

## 2025-06-05 PROCEDURE — 97530 THERAPEUTIC ACTIVITIES: CPT | Performed by: PHYSICAL THERAPIST

## 2025-06-05 NOTE — PROGRESS NOTES
"Daily Note     Today's date: 2025  Patient name: Aroldo Leyva  : 1965  MRN: [id number]  Referring provider: Nikolay De Jesus MD  Dx:   Encounter Diagnosis     ICD-10-CM    1. Chronic midline low back pain without sciatica  M54.50     G89.29           Start Time: 1015  Stop Time: 1110  Total time in clinic (min): 55 minutes    Subjective: Minimal change in status.     Objective: See treatment diary below    Assessment: Tolerated treatment well. Patient demonstrated fatigue post treatment, exhibited good technique with therapeutic exercises, and would benefit from continued PT 1:1 with Oneal Yeh DPT for entirety of tx.     Plan: Continue per plan of care.      Precautions: HX of cervical fusion , Hx of smoking (quit in 2025)    Visit/Unit Tracking  AUTH Status:  Date 5/8 5/15 5/20 6/5   pending Used 1 2 3 4    Remaining           Pertinent Findings:      POC End Date: 25                                                                                          Test / Measure     FOTO (Predicted 50) 41   Core mmt 2+/5   Standing tolerance 2'         Treatment Based Classification: Primary graded exposure Secondary stabilization    Manuals 5/8 5/15 5/20 6/5                               Neuro Re-Ed       Ball crush  5\" 2x10 5\" 2x10 5\" 2x10   Bridges  2x15 2x15 3x15   HL clam  3x10 btb 3x10 btb 3x10 btb   Dead bug  2x10 ea 3x10 ea 3x10 ea   Paloff Press  2x10 bhb 2x10 bhb 2x10 bhb   Paloff Rot  2x10 bhb 2x10 bhb 2x10 bhb          Ther Ex       HEP/PN 8      NS  10' 10' 10' L5   LTRs  2x30 2x30 2x30                                      Ther Activity       Suitcase carry   nv 4x10# ea   Goblet STS    3x10 10#   Front carry   nv 23 x10#   Gait Training                     Modalities                              "

## 2025-06-09 ENCOUNTER — OFFICE VISIT (OUTPATIENT)
Age: 60
End: 2025-06-09
Payer: MEDICARE

## 2025-06-09 DIAGNOSIS — M54.50 CHRONIC MIDLINE LOW BACK PAIN WITHOUT SCIATICA: Primary | ICD-10-CM

## 2025-06-09 DIAGNOSIS — G89.29 CHRONIC MIDLINE LOW BACK PAIN WITHOUT SCIATICA: Primary | ICD-10-CM

## 2025-06-09 PROCEDURE — 97530 THERAPEUTIC ACTIVITIES: CPT

## 2025-06-09 PROCEDURE — 97110 THERAPEUTIC EXERCISES: CPT

## 2025-06-09 PROCEDURE — 97112 NEUROMUSCULAR REEDUCATION: CPT

## 2025-06-09 NOTE — PROGRESS NOTES
"Daily Note     Today's date: 2025  Patient name: Aroldo Leyva  : 1965  MRN: [id number]  Referring provider: Nikolay De Jesus MD  Dx:   Encounter Diagnosis     ICD-10-CM    1. Chronic midline low back pain without sciatica  M54.50     G89.29           Start Time: 1115  Stop Time: 1200  Total time in clinic (min): 45 minutes    Subjective: Pt reports slight exacerbation in discomfort this morning prior to start of tx session.  States that pain is typically worse in the evenings.        Objective: See treatment diary below      Assessment: Tolerated treatment well. Patient demonstrated fatigue post treatment and would benefit from continued PT.  Pt able to tolerate slight progression of planned therapeutic interventions this visit.  No pain exacerbation during and after tx session.  1:1 with Bhanu Cisneros DPT entirety of tx.        Plan: Continue per plan of care.      Precautions: HX of cervical fusion , Hx of smoking (quit in 2025)    Visit/Unit Tracking  AUTH Status:  Date 5/8 5/15 5/20 6/5 6/9   8v / 2025 Used 1 2 3 4 5    Remaining            Pertinent Findings:      POC End Date: 25                                                                                          Test / Measure     FOTO (Predicted 50) 41   Core mmt 2+/5   Standing tolerance 2'         Treatment Based Classification: Primary graded exposure Secondary stabilization    Manuals 5/8 5/15 5/20 6/5 6/9                                   Neuro Re-Ed        Ball crush  5\" 2x10 5\" 2x10 5\" 2x10 5\" 2x10 stand   Bridges  2x15 2x15 3x15 2x10 + gtb clams   HL clam  3x10 btb 3x10 btb 3x10 btb    Dead bug  2x10 ea 3x10 ea 3x10 ea 3x10 ea   Paloff Press  2x10 bhb 2x10 bhb 2x10 bhb    Paloff Rot  2x10 bhb 2x10 bhb 2x10 bhb 3x10 6#           Ther Ex        HEP/PN 8       NS  10' 10' 10' L5 10' L6   LTRs  2x30 2x30 2x30 30x                                           Ther Activity        Suitcase carry   nv 4x10# ea 4 laps ea 10# " "  Goblet STS    3x10 10#    Front carry   nv 23 x10# 4 laps 10#   LSU     15x B 4\"   Gait Training                        Modalities                                   "

## 2025-06-12 ENCOUNTER — APPOINTMENT (OUTPATIENT)
Age: 60
End: 2025-06-12
Payer: MEDICARE

## 2025-06-19 ENCOUNTER — OFFICE VISIT (OUTPATIENT)
Age: 60
End: 2025-06-19
Payer: MEDICARE

## 2025-06-19 DIAGNOSIS — G89.29 CHRONIC MIDLINE LOW BACK PAIN WITHOUT SCIATICA: Primary | ICD-10-CM

## 2025-06-19 DIAGNOSIS — M54.50 CHRONIC MIDLINE LOW BACK PAIN WITHOUT SCIATICA: Primary | ICD-10-CM

## 2025-06-19 PROCEDURE — 97140 MANUAL THERAPY 1/> REGIONS: CPT | Performed by: PHYSICAL THERAPIST

## 2025-06-19 PROCEDURE — 97110 THERAPEUTIC EXERCISES: CPT | Performed by: PHYSICAL THERAPIST

## 2025-06-19 PROCEDURE — 97112 NEUROMUSCULAR REEDUCATION: CPT | Performed by: PHYSICAL THERAPIST

## 2025-06-25 ENCOUNTER — APPOINTMENT (OUTPATIENT)
Age: 60
End: 2025-06-25
Payer: MEDICARE

## 2025-06-27 ENCOUNTER — APPOINTMENT (OUTPATIENT)
Age: 60
End: 2025-06-27
Payer: MEDICARE